# Patient Record
Sex: MALE | Race: WHITE
[De-identification: names, ages, dates, MRNs, and addresses within clinical notes are randomized per-mention and may not be internally consistent; named-entity substitution may affect disease eponyms.]

---

## 2018-03-12 ENCOUNTER — HOSPITAL ENCOUNTER (EMERGENCY)
Dept: HOSPITAL 45 - C.EDB | Age: 38
LOS: 1 days | Discharge: HOME | End: 2018-03-13
Payer: COMMERCIAL

## 2018-03-12 VITALS
HEIGHT: 72.01 IN | WEIGHT: 185.19 LBS | HEIGHT: 72.01 IN | BODY MASS INDEX: 25.08 KG/M2 | WEIGHT: 185.19 LBS | BODY MASS INDEX: 25.08 KG/M2

## 2018-03-12 VITALS — TEMPERATURE: 98.6 F

## 2018-03-12 DIAGNOSIS — F32.9: ICD-10-CM

## 2018-03-12 DIAGNOSIS — R11.2: Primary | ICD-10-CM

## 2018-03-12 LAB
ALBUMIN SERPL-MCNC: 4.3 GM/DL (ref 3.4–5)
ALP SERPL-CCNC: 70 U/L (ref 45–117)
ALT SERPL-CCNC: 25 U/L (ref 12–78)
AST SERPL-CCNC: 18 U/L (ref 15–37)
BASOPHILS # BLD: 0.02 K/UL (ref 0–0.2)
BASOPHILS NFR BLD: 0.1 %
BUN SERPL-MCNC: 18 MG/DL (ref 7–18)
CALCIUM SERPL-MCNC: 9.2 MG/DL (ref 8.5–10.1)
CO2 SERPL-SCNC: 24 MMOL/L (ref 21–32)
CREAT SERPL-MCNC: 1.21 MG/DL (ref 0.6–1.4)
EOS ABS #: 0 K/UL (ref 0–0.5)
EOSINOPHIL NFR BLD AUTO: 323 K/UL (ref 130–400)
GLUCOSE SERPL-MCNC: 121 MG/DL (ref 70–99)
HCT VFR BLD CALC: 44.2 % (ref 42–52)
HGB BLD-MCNC: 16 G/DL (ref 14–18)
IG#: 0.06 K/UL (ref 0–0.02)
IMM GRANULOCYTES NFR BLD AUTO: 14.3 %
LIPASE: 62 U/L (ref 73–393)
LYMPHOCYTES # BLD: 2.06 K/UL (ref 1.2–3.4)
MCH RBC QN AUTO: 32.2 PG (ref 25–34)
MCHC RBC AUTO-ENTMCNC: 36.2 G/DL (ref 32–36)
MCV RBC AUTO: 88.9 FL (ref 80–100)
MONO ABS #: 1.05 K/UL (ref 0.11–0.59)
MONOCYTES NFR BLD: 7.3 %
NEUT ABS #: 11.26 K/UL (ref 1.4–6.5)
NEUTROPHILS # BLD AUTO: 0 %
NEUTROPHILS NFR BLD AUTO: 77.9 %
PMV BLD AUTO: 9.7 FL (ref 7.4–10.4)
POTASSIUM SERPL-SCNC: 3.8 MMOL/L (ref 3.5–5.1)
PROT SERPL-MCNC: 8.1 GM/DL (ref 6.4–8.2)
RED CELL DISTRIBUTION WIDTH CV: 13 % (ref 11.5–14.5)
RED CELL DISTRIBUTION WIDTH SD: 41.9 FL (ref 36.4–46.3)
SODIUM SERPL-SCNC: 138 MMOL/L (ref 136–145)
WBC # BLD AUTO: 14.45 K/UL (ref 4.8–10.8)

## 2018-03-13 VITALS — SYSTOLIC BLOOD PRESSURE: 140 MMHG | HEART RATE: 83 BPM | OXYGEN SATURATION: 97 % | DIASTOLIC BLOOD PRESSURE: 83 MMHG

## 2018-03-13 NOTE — EMERGENCY ROOM VISIT NOTE
History


First contact with patient:  21:56


Chief Complaint:  VOMITING


Stated Complaint:  VOMITING


Nursing Triage Summary:  


Pt states persistent vomiting since sunday, states unable to keep foods or 


fluids down. Pt states abdominal pain mid abdomen, states LBM sunday night 

which 


was normal. Pt denies pain in abdomen on palpation.





History of Present Illness


The patient is a 37 year old male who presents to the Emergency Room with 

complaints of vomiting for the past 24 hours.  Patient states he has had 

approximately 10 episodes of vomiting and has been unable to keep anything 

down.  He has some discomfort in his abdomen rated a 3/10, but denies any true 

pain.  He denies any diarrhea or changes in bowel movements.  The patient does 

report that over the past several weeks, he has had approximately one episode 

of vomiting per week after eating.  He does not relate this to any specific 

foods.  He has had no abdominal pain or changes in his bowel movements.  He 

denies any fevers.  Patient reports a history of depression but is otherwise 

healthy.





Review of Systems


A complete 10 point review of systems was reviewed with the patient with 

pertinent positives and negatives as per history of present illness. All else 

were negative.





Past Medical/Surgical History





Medical Problems:


(1) Depression





Social History


Smoking Status:  Never Smoker


Alcohol Use:  occasionally


Drug Use:  none


Housing Status:  lives alone





Current/Historical Medications


Scheduled


Bupropion HCl (Bupropion HCl Sr), 150 MG PO BID


Ondasetron Odt (Zofran Odt), 4 MG SL Q6H





Physical Exam


Vital Signs











  Date Time  Temp Pulse Resp B/P (MAP) Pulse Ox O2 Delivery O2 Flow Rate FiO2


 


3/13/18 00:38  83 18 140/83 97 Room Air  


 


3/12/18 23:16 37.0 83 16 143/84 98 Room Air  


 


3/12/18 21:49 37.5 93 16 165/93 97 Room Air  











Physical Exam


VITALS: Vitals are noted on the nurse's note and reviewed by myself.  Vital 

signs stable.


GENERAL: This is a 37-year-old male, in no acute distress, nondiaphoretic, well-

developed well-nourished.


SKIN: The skin was without rashes.


EARS: External auditory canals clear, tympanic membranes pearly gray without 

erythema or effusion bilaterally.


EYES: Pupils equal round and reactive to light and accommodation.  


MOUTH: Mucous membranes moist.  Tonsils are not enlarged.  Pharynx without 

erythema or exudate.  


NECK: Supple without nuchal rigidity.  No lymphadenopathy.


HEART: Regular rate and rhythm without murmurs gallops or rubs.


LUNGS: Clear to auscultation bilaterally without wheezes, rales or rhonchi.  


ABDOMEN: Positive bowel sounds x 4.  Soft, nontender to palpation.


NEURO: Patient was alert and oriented to person place and time.





Medical Decision & Procedures


Laboratory Results


3/12/18 22:24








Red Blood Count 4.97, Mean Corpuscular Volume 88.9, Mean Corpuscular Hemoglobin 

32.2, Mean Corpuscular Hemoglobin Concent 36.2, Mean Platelet Volume 9.7, 

Neutrophils (%) (Auto) 77.9, Lymphocytes (%) (Auto) 14.3, Monocytes (%) (Auto) 

7.3, Eosinophils (%) (Auto) 0.0, Basophils (%) (Auto) 0.1, Neutrophils # (Auto) 

11.26, Lymphocytes # (Auto) 2.06, Monocytes # (Auto) 1.05, Eosinophils # (Auto) 

0.00, Basophils # (Auto) 0.02





3/12/18 22:24

















Test


  3/12/18


22:24


 


White Blood Count


  14.45 K/uL


(4.8-10.8)


 


Red Blood Count


  4.97 M/uL


(4.7-6.1)


 


Hemoglobin


  16.0 g/dL


(14.0-18.0)


 


Hematocrit 44.2 % (42-52) 


 


Mean Corpuscular Volume


  88.9 fL


()


 


Mean Corpuscular Hemoglobin


  32.2 pg


(25-34)


 


Mean Corpuscular Hemoglobin


Concent 36.2 g/dl


(32-36)


 


Platelet Count


  323 K/uL


(130-400)


 


Mean Platelet Volume


  9.7 fL


(7.4-10.4)


 


Neutrophils (%) (Auto) 77.9 % 


 


Lymphocytes (%) (Auto) 14.3 % 


 


Monocytes (%) (Auto) 7.3 % 


 


Eosinophils (%) (Auto) 0.0 % 


 


Basophils (%) (Auto) 0.1 % 


 


Neutrophils # (Auto)


  11.26 K/uL


(1.4-6.5)


 


Lymphocytes # (Auto)


  2.06 K/uL


(1.2-3.4)


 


Monocytes # (Auto)


  1.05 K/uL


(0.11-0.59)


 


Eosinophils # (Auto)


  0.00 K/uL


(0-0.5)


 


Basophils # (Auto)


  0.02 K/uL


(0-0.2)


 


RDW Standard Deviation


  41.9 fL


(36.4-46.3)


 


RDW Coefficient of Variation


  13.0 %


(11.5-14.5)


 


Immature Granulocyte % (Auto) 0.4 % 


 


Immature Granulocyte # (Auto)


  0.06 K/uL


(0.00-0.02)


 


Urine Color YELLOW 


 


Urine Appearance CLEAR (CLEAR) 


 


Urine pH 5.5 (4.5-7.5) 


 


Urine Specific Gravity


  1.031


(1.000-1.030)


 


Urine Protein NEG (NEG) 


 


Urine Glucose (UA) NEG (NEG) 


 


Urine Ketones 1+ (NEG) 


 


Urine Occult Blood NEG (NEG) 


 


Urine Nitrite NEG (NEG) 


 


Urine Bilirubin NEG (NEG) 


 


Urine Urobilinogen NEG (NEG) 


 


Urine Leukocyte Esterase NEG (NEG) 


 


Anion Gap


  11.0 mmol/L


(3-11)


 


Est Creatinine Clear Calc


Drug Dose 92.7 ml/min 


 


 


Estimated GFR (


American) 88.7 


 


 


Estimated GFR (Non-


American 76.6 


 


 


BUN/Creatinine Ratio 15.1 (10-20) 


 


Calcium Level


  9.2 mg/dl


(8.5-10.1)


 


Total Bilirubin


  1.5 mg/dl


(0.2-1)


 


Direct Bilirubin


  0.3 mg/dl


(0-0.2)


 


Aspartate Amino Transf


(AST/SGOT) 18 U/L (15-37) 


 


 


Alanine Aminotransferase


(ALT/SGPT) 25 U/L (12-78) 


 


 


Alkaline Phosphatase


  70 U/L


()


 


Total Protein


  8.1 gm/dl


(6.4-8.2)


 


Albumin


  4.3 gm/dl


(3.4-5.0)


 


Lipase


  62 U/L


()











Medications Administered











 Medications


  (Trade)  Dose


 Ordered  Sig/Javad


 Route  Start Time


 Stop Time Status Last Admin


Dose Admin


 


 Sodium Chloride  1,000 ml @ 


 999 mls/hr  Q1H1M STAT


 IV  3/12/18 22:08


 3/12/18 23:08 DC 3/12/18 22:29


999 MLS/HR


 


 Ondansetron HCl


  (Zofran Inj)  4 mg  NOW  STAT


 IV  3/12/18 22:08


 3/12/18 22:10 DC 3/12/18 22:29


4 MG


 


 Ondansetron HCl


  (Zofran Inj)  4 mg  NOW  STAT


 IV  3/12/18 23:27


 3/12/18 23:28 DC 3/12/18 23:31


4 MG


 


 Ondansetron HCl


  (ZOFRAN ODT 4MG


 Home Pack)  1 homepack  UD  ONCE


 PO  3/13/18 00:30


 3/13/18 00:31 DC 3/13/18 00:33


1 HOMEPACK











Medical Decision


Differential diagnosis includes gastroenteritis, gastritis, pancreatitis, 

cholecystitis, bowel obstruction, among others.





The patient is a 37-year-old male who presents today complaining of vomiting.  

Patient has had persistent vomiting for the past 10 hours.  Over the past few 

weeks, he has had a few intermittent episodes of vomiting.  He has no abdominal 

tenderness on exam.  History is most consistent with a gastroenteritis.  Labs 

revealed mild leukocytosis of 14,000, likely secondary to vomiting.  Bilirubin 

mildly elevated at 1.5.  Lipase was not elevated.  Urinalysis was not 

suggestive of infection.  Patient was hydrated and treated with Zofran.  This 

improved his symptoms significantly and he was able to tolerate some Gatorade 

and crackers.  Patient was advised to return with worsening vomiting, focal 

abdominal pain or fevers.  He was advised to follow-up with his PCP this week 

for recheck and to have his bilirubin rechecked.





Based on the patient's presentation and work up, I feel the patient is stable 

for outpatient treatment.  The patient was educated to return to the emergency 

department for any worsening of their current condition or new/concerning 

symptoms.  He will follow up with his PCP.





Blood Pressure Screening


Patient's blood pressure:  Elevated blood pressure


Blood pressure disposition:  Elevated BP felt to be situational





Impression





 Primary Impression:  


 Vomiting





Departure Information


Dispostion


Home / Self-Care





Condition


GOOD





Prescriptions





Ondasetron Odt (ZOFRAN ODT) 4 Mg Tab


4 MG SL Q6H for Nausea, #10 TAB


   Prov: Elizabeth Henderson .JENY         3/13/18





Referrals


No Doctor, Assigned (PCP)





Patient Instructions


My [facility]





Additional Instructions





You have been prescribed Zofran to be used for any nausea or vomiting. Take as 

prescribed.





For pain control, you can use the following over-the-counter medicines (if >13 yo):





- Regular strength (325mg/tab) Tylenol (acetaminophen) 2 tabs every 4-6 hours 

as needed. Do not exceed 12 tablets in a 24 hour period. Avoid taking more than 

4 grams (4000 mg) of Tylenol per day. This includes any other sources of 

acetaminophen you may take on a regular basis.





- Regular strength (200 mg/tab) Advil (ibuprofen) 1-2 tabs every 4-6 hours as 

needed. Do not exceed a dose of 3200 mg per day.





Follow-up with your primary care provider this week for a recheck.





Return to the emergency department with worsening vomiting, abdominal pain, 

high fever or other new/concerning symptoms.





Problem Qualifiers








 Primary Impression:  


 Vomiting


 Vomiting type:  unspecified  Vomiting Intractability:  non-intractable  Nausea 

presence:  with nausea  Qualified Codes:  R11.2 - Nausea with vomiting, 

unspecified